# Patient Record
Sex: FEMALE | Race: WHITE | NOT HISPANIC OR LATINO | Employment: OTHER | URBAN - METROPOLITAN AREA
[De-identification: names, ages, dates, MRNs, and addresses within clinical notes are randomized per-mention and may not be internally consistent; named-entity substitution may affect disease eponyms.]

---

## 2024-04-03 ENCOUNTER — OFFICE VISIT (OUTPATIENT)
Dept: ORTHOPEDIC SURGERY | Facility: CLINIC | Age: 69
End: 2024-04-03
Payer: COMMERCIAL

## 2024-04-03 ENCOUNTER — HOSPITAL ENCOUNTER (OUTPATIENT)
Dept: RADIOLOGY | Facility: CLINIC | Age: 69
Discharge: HOME | End: 2024-04-03
Payer: COMMERCIAL

## 2024-04-03 DIAGNOSIS — M25.562 BILATERAL KNEE PAIN: ICD-10-CM

## 2024-04-03 DIAGNOSIS — M25.561 BILATERAL KNEE PAIN: Primary | ICD-10-CM

## 2024-04-03 DIAGNOSIS — Z96.653 HISTORY OF BILATERAL KNEE ARTHROPLASTY: ICD-10-CM

## 2024-04-03 DIAGNOSIS — M25.561 BILATERAL KNEE PAIN: ICD-10-CM

## 2024-04-03 DIAGNOSIS — M25.562 BILATERAL KNEE PAIN: Primary | ICD-10-CM

## 2024-04-03 PROCEDURE — 1160F RVW MEDS BY RX/DR IN RCRD: CPT | Performed by: ORTHOPAEDIC SURGERY

## 2024-04-03 PROCEDURE — 1036F TOBACCO NON-USER: CPT | Performed by: ORTHOPAEDIC SURGERY

## 2024-04-03 PROCEDURE — 1157F ADVNC CARE PLAN IN RCRD: CPT | Performed by: ORTHOPAEDIC SURGERY

## 2024-04-03 PROCEDURE — 73562 X-RAY EXAM OF KNEE 3: CPT | Mod: BILATERAL PROCEDURE | Performed by: RADIOLOGY

## 2024-04-03 PROCEDURE — 1159F MED LIST DOCD IN RCRD: CPT | Performed by: ORTHOPAEDIC SURGERY

## 2024-04-03 PROCEDURE — 99213 OFFICE O/P EST LOW 20 MIN: CPT | Performed by: ORTHOPAEDIC SURGERY

## 2024-04-03 PROCEDURE — 73562 X-RAY EXAM OF KNEE 3: CPT | Mod: 50

## 2024-04-03 ASSESSMENT — ENCOUNTER SYMPTOMS
KNEE SWELLING: 1
KNEE DEFORMITY: 1

## 2024-04-03 NOTE — PROGRESS NOTES
Subjective    Patient ID: Cherise Souza is a 68 y.o. female.    Chief Complaint: Follow-up of the Left Knee (FUV BL TKA DOS 11/12/2008) and Follow-up of the Right Knee (FUV BL TKA DOS 11/12/2008)       This is a soon-to-be 69-year-old female who is now 16 years status post bilateral total knee replacements performed at the same setting.  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  She is not having any significant discomfort and has not noted swelling.  Denies mechanical symptoms.    This patient's past medical, social, and family history were reviewed as well as a review of systems including updates on the patient's information encounter sheet    Physical Examination  Constitutional: Patient's height and weight reviewed, appears well kempt  Psychiatric: Alert and oriented ×3, appropriate mood and behavior  Pulmonary: Breathing appears nonlabored, no apparent distress  Lymphatic: No appreciable lymphadenopathy to both the upper and lower extremities  Skin: No open lesions, rashes, ulcerations  Neurologic: Gross motor and sensory exam appear intact (except for abnormalities noted in the below muscle skeletal exam)    Musculoskeletal: There appears to be satisfactory range of motion of each hip without groin or thigh pain elicited.  Each knee demonstrates no significant effusion.  Each knee has full active extension.  Flexion on the right just to 105 degrees and the left 100 degrees.  Satisfactory patellofemoral tracking in each knee throughout her range of motion.  No gross ligamentous instability.    X-rays performed today and reviewed at length with the patient demonstrate the bilateral total knee arthroplasties remain well-positioned without evidence component loosening.  There may be some degree of polyethylene wear regarding the left patella.    Assessment: Doing well status post bilateral total knee arthroplasties 16 years out    Plan: Continue with  activities as tolerated.  Avoid fall risk.  Follow-up every 3 years for reevaluation to include x-rays.    Left Knee     Right Knee         No current outpatient medications on file.